# Patient Record
Sex: FEMALE | Race: BLACK OR AFRICAN AMERICAN | NOT HISPANIC OR LATINO | URBAN - METROPOLITAN AREA
[De-identification: names, ages, dates, MRNs, and addresses within clinical notes are randomized per-mention and may not be internally consistent; named-entity substitution may affect disease eponyms.]

---

## 2017-08-09 ENCOUNTER — EMERGENCY (EMERGENCY)
Facility: HOSPITAL | Age: 32
LOS: 0 days | Discharge: HOME | End: 2017-08-09
Attending: EMERGENCY MEDICINE
Payer: COMMERCIAL

## 2017-08-09 VITALS
WEIGHT: 270.07 LBS | HEIGHT: 69 IN | OXYGEN SATURATION: 98 % | DIASTOLIC BLOOD PRESSURE: 78 MMHG | HEART RATE: 82 BPM | TEMPERATURE: 99 F | SYSTOLIC BLOOD PRESSURE: 122 MMHG

## 2017-08-09 VITALS
HEART RATE: 75 BPM | SYSTOLIC BLOOD PRESSURE: 132 MMHG | DIASTOLIC BLOOD PRESSURE: 70 MMHG | OXYGEN SATURATION: 99 % | TEMPERATURE: 98 F | RESPIRATION RATE: 18 BRPM

## 2017-08-09 PROCEDURE — 99283 EMERGENCY DEPT VISIT LOW MDM: CPT

## 2017-08-09 RX ORDER — OMEPRAZOLE 10 MG/1
0 CAPSULE, DELAYED RELEASE ORAL
Qty: 0 | Refills: 0 | COMMUNITY

## 2017-08-09 RX ORDER — DIPHENHYDRAMINE HCL 50 MG
50 CAPSULE ORAL ONCE
Qty: 0 | Refills: 0 | Status: COMPLETED | OUTPATIENT
Start: 2017-08-09 | End: 2017-08-09

## 2017-08-09 RX ORDER — DIPHENHYDRAMINE HCL 50 MG
2 CAPSULE ORAL
Qty: 16 | Refills: 0 | OUTPATIENT
Start: 2017-08-09 | End: 2017-08-11

## 2017-08-09 RX ADMIN — Medication 60 MILLIGRAM(S): at 12:14

## 2017-08-09 RX ADMIN — Medication 50 MILLIGRAM(S): at 09:41

## 2017-08-09 NOTE — ED PROVIDER NOTE - MEDICAL DECISION MAKING DETAILS
31yo F w/ pmh as documented above, here for diffuse body rash. H&P most consistent w/ allergic reaction (unknown if rxn to new skin product, latex condom, or food allergy). Benadryl here and will reassess. Instructed pt to discontinue new soap and latex condoms, as well as keep a diary of skin/beauty products used and food consumed to help determine source of allergy. Pt will also f/u with PMD and allergist outpt.

## 2017-08-09 NOTE — ED PROVIDER NOTE - OBJECTIVE STATEMENT
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 Pt reports she started using new soap a week ago (melon), and used latex condom for first time 2 days ago (but does not have a known latex allergy). Felt mild vaginal irritation after use of latex condom but no dysuria/discharge/vaginal bleeding. Rash is on b/l thighs and abdomen, pruritic. Has not taken any meds for symptoms. Denies new medication use. NO sob, throat swelling, or abd pain/discomfort. No fever/chills, No photophobia/eye pain/changes in vision, No ear pain/sore throat/dysphagia, No chest pain/palpitations, no SOB/cough/wheeze/stridor, No abdominal pain, No N/V/D, no dysuria/frequency/discharge, No neck/back pain, no changes in neurological status/function.

## 2017-08-09 NOTE — ED PROVIDER NOTE - PHYSICAL EXAMINATION
Gen: Alert, NAD  Head: NC, AT, EOMI, normal lids/conjunctiva  ENT: normal hearing, patent oropharynx without erythema/exudate, uvula midline  Neck: +supple, no tenderness/meningismus/JVD, +Trachea midline  Chest: no chest wall tenderness, equal chest rise  Pulm: Bilateral BS, normal resp effort, no wheeze/stridor/retractions  CV: RRR, no M/R/G  Abd: soft, NT/ND, +BS, no hepatosplenomegaly  Rectal: deferred  Skin: reddish maculopapular rash and hives throughout legs and abd, +blanching.   Neuro: AAOx3, no gross neuro deficits noted.

## 2017-08-09 NOTE — ED PROVIDER NOTE - PROGRESS NOTE DETAILS
Reassessed pt, still has hives/skin rash, only slightly improved, but states itching has almost resolved. Reassessed airway, still no signs of resp compromise or oropharyngeal edema. Will give prednisone. Reiterated discharge instructions and reasons for which to return. Pt is comfortable with plan.

## 2017-08-09 NOTE — ED ADULT NURSE NOTE - OBJECTIVE STATEMENT
pt received alert and oriented x3, pt with elevated rash all over body, non draining, pt is unaware of what caused rash, pt denies any sob, or difficulty swallowing

## 2017-08-10 DIAGNOSIS — L23.9 ALLERGIC CONTACT DERMATITIS, UNSPECIFIED CAUSE: ICD-10-CM

## 2017-08-10 DIAGNOSIS — L50.8 OTHER URTICARIA: ICD-10-CM

## 2017-08-10 DIAGNOSIS — Z88.2 ALLERGY STATUS TO SULFONAMIDES: ICD-10-CM
